# Patient Record
Sex: FEMALE | Race: ASIAN | NOT HISPANIC OR LATINO | URBAN - METROPOLITAN AREA
[De-identification: names, ages, dates, MRNs, and addresses within clinical notes are randomized per-mention and may not be internally consistent; named-entity substitution may affect disease eponyms.]

---

## 2022-12-06 ENCOUNTER — EMERGENCY (EMERGENCY)
Facility: HOSPITAL | Age: 37
LOS: 0 days | Discharge: HOME | End: 2022-12-06
Attending: EMERGENCY MEDICINE | Admitting: EMERGENCY MEDICINE

## 2022-12-06 VITALS
DIASTOLIC BLOOD PRESSURE: 68 MMHG | HEART RATE: 98 BPM | TEMPERATURE: 99 F | SYSTOLIC BLOOD PRESSURE: 119 MMHG | OXYGEN SATURATION: 98 % | RESPIRATION RATE: 20 BRPM

## 2022-12-06 DIAGNOSIS — J02.9 ACUTE PHARYNGITIS, UNSPECIFIED: ICD-10-CM

## 2022-12-06 DIAGNOSIS — R50.9 FEVER, UNSPECIFIED: ICD-10-CM

## 2022-12-06 DIAGNOSIS — Z20.822 CONTACT WITH AND (SUSPECTED) EXPOSURE TO COVID-19: ICD-10-CM

## 2022-12-06 DIAGNOSIS — R05.9 COUGH, UNSPECIFIED: ICD-10-CM

## 2022-12-06 DIAGNOSIS — J10.1 INFLUENZA DUE TO OTHER IDENTIFIED INFLUENZA VIRUS WITH OTHER RESPIRATORY MANIFESTATIONS: ICD-10-CM

## 2022-12-06 LAB
FLUAV AG NPH QL: DETECTED
FLUBV AG NPH QL: SIGNIFICANT CHANGE UP
RSV RNA NPH QL NAA+NON-PROBE: SIGNIFICANT CHANGE UP
SARS-COV-2 RNA SPEC QL NAA+PROBE: SIGNIFICANT CHANGE UP

## 2022-12-06 PROCEDURE — 99284 EMERGENCY DEPT VISIT MOD MDM: CPT

## 2022-12-06 NOTE — ED PROVIDER NOTE - NS ED ROS FT
As follows:   CONST: + fever, chills, bodyaches  EYES: No pain, redness, drainage or visual changes.  ENT: No ear pain or discharge, nasal discharge or congestion. No sore throat  CARD: No chest pain, palpitations  RESP: + cough. No SOB, hemoptysis. No hx of asthma or COPD  GI: No abdominal pain, N/V/D

## 2022-12-06 NOTE — ED PROVIDER NOTE - ATTENDING APP SHARED VISIT CONTRIBUTION OF CARE
37-year-old female no past medical history presents with fever congestion chills cough body aches.  Patient's daughter is flu positive.  Patient is a nurse.  Patient is no other complaints.  No abdominal pain.  Patient is tolerating p.o.  Patient is able to eat and drink without any difficulty.  Patient is awake alert.  Vital signs stable.  Patient was already started on Tamiflu as an outpatient.  Patient with nasal congestion.  No trismus.  Lungs clear.  Patient is nontoxic well-appearing.  Patient with viral syndrome.  Patient discharged with outpatient follow-up.  Will send swab.

## 2022-12-06 NOTE — ED PROVIDER NOTE - NSFOLLOWUPINSTRUCTIONS_ED_ALL_ED_FT
Viral Syndrome    WHAT YOU NEED TO KNOW:    Viral syndrome is a term used for symptoms of an infection caused by a virus. Viruses are spread easily from person to person through the air and on shared items. An illness caused by a virus usually goes away in 10 to 14 days without treatment. Antibiotics are not given for a viral infection.     DISCHARGE INSTRUCTIONS:    Call 911 for the following:     You have a seizure.     You cannot be woken.     You have chest pain or trouble breathing.     Seek care immediately if:     You have a stiff neck, a bad headache, and sensitivity to light.     You feel weak, dizzy, or confused.     You stop urinating or urinate a lot less than normal.     You cough up blood or thick, yellow or green, mucus.     You have severe abdominal pain or your abdomen is larger than usual.     Contact your healthcare provider if:     Your symptoms do not get better with treatment, or get worse, after 3 days.     You have a rash or ear pain.     You have burning when you urinate.     You have questions or concerns about your condition or care.    Medicines: You may need any of the following:     Acetaminophen decreases pain and fever. It is available without a doctor's order. Ask how much medicine to take and how often to take it. Follow directions. Acetaminophen can cause liver damage if not taken correctly.     NSAIDs, such as ibuprofen, help decrease swelling, pain, and fever. NSAIDs can cause stomach bleeding or kidney problems in certain people. If you take blood thinner medicine, always ask your healthcare provider if NSAIDs are safe for you. Always read the medicine label and follow directions.    Cold medicine helps decrease swelling, control a cough, and relieve chest or nasal congestion.     Saline nasal spray helps decrease nasal congestion.     Take your medicine as directed. Contact your healthcare provider if you think your medicine is not helping or if you have side effects. Tell him of her if you are allergic to any medicine. Keep a list of the medicines, vitamins, and herbs you take. Include the amounts, and when and why you take them. Bring the list or the pill bottles to follow-up visits. Carry your medicine list with you in case of an emergency.    Manage your symptoms:     Drink liquids as directed to prevent dehydration. Ask how much liquid to drink each day and which liquids are best for you. Ask if you should drink an oral rehydration solution (ORS). An ORS has the right amounts of water, salts, and sugar you need to replace body fluids. This may help prevent dehydration caused by vomiting or diarrhea. Do not drink liquids with caffeine. Drinks with caffeine can make dehydration worse.     Get plenty of rest to help your body heal. Take naps throughout the day. Ask your healthcare provider when you can return to work and your normal activities.     Use a cool mist humidifier to help you breathe easier if you have nasal or chest congestion. Ask your healthcare provider how to use a cool mist humidifier.     Eat honey or use cough drops to help decrease throat discomfort. Ask your healthcare provider how much honey you should eat each day. Cough drops are available without a doctor's order. Follow directions for taking cough drops.     Do not smoke and stay away from others who smoke. Nicotine and other chemicals in cigarettes and cigars can cause lung damage. Smoking can also delay healing. Ask your healthcare provider for information if you currently smoke and need help to quit. E-cigarettes or smokeless tobacco still contain nicotine. Talk to your healthcare provider before you use these products.     Wash your hands frequently to prevent the spread of germs to others. Use soap and water. Use gel hand  when soap and water are not available. Wash your hands after you use the bathroom, cough, or sneeze. Wash your hands before you prepare or eat food.     Follow up with your healthcare provider as directed: Write down your questions so you remember to ask them during your visits.

## 2022-12-06 NOTE — ED PROVIDER NOTE - CLINICAL SUMMARY MEDICAL DECISION MAKING FREE TEXT BOX
Patient with viral syndrome.  Patient with recent sick contact of somebody with the flu.  Patient is nontoxic well-appearing.  Patient breathing comfortably.  Patient with viral syndrome.  Patient discharged with outpatient follow-up.

## 2022-12-06 NOTE — ED PROVIDER NOTE - PATIENT PORTAL LINK FT
You can access the FollowMyHealth Patient Portal offered by [facility] by registering at the following website: [email or web address]. By joining [employer]s FollowMyHealth portal, you will also be able to view your health information using other applications (apps) compatible with our system.

## 2022-12-06 NOTE — ED PROVIDER NOTE - PHYSICAL EXAMINATION
As Follows:  CONST: Well appearing in NAD  EYES: EOMI, Sclera and conjunctiva clear.  ENT: No nasal discharge. Oropharynx normal appearing, no erythema or exudates. Uvula midline.  CARD: Normal S1 S2; Normal rate and rhythm  RESP: Equal BS B/L, No wheezes, rhonchi or rales. No distress

## 2022-12-06 NOTE — ED PROVIDER NOTE - ADDITIONAL NOTES AND INSTRUCTIONS:
Ms. Horvath was present in the Emergency Department on 12/6/22. She is currently pending her Flu/Covid/RSV swab results.

## 2022-12-06 NOTE — ED PROVIDER NOTE - OBJECTIVE STATEMENT
Pt is a 38 y/o female with no PMHx presents for fever, chills, nonproductive cough, sore throat, and body aches. She states her daughter is sick flu + at home. 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 She denies any shortness of breath, chest pain, N/V/D/C, abdominal pain, or urinary complaints. Pt is a 38 y/o female with no PMHx presents for fever, chills, nonproductive cough, sore throat, and body aches without aggravating or relieving factors. She states her daughter is sick flu + at home. 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 She denies any shortness of breath, chest pain, N/V/D/C, abdominal pain, or urinary complaints.

## 2023-02-12 NOTE — ED ADULT NURSE NOTE - NSIMPLEMENTINTERV_GEN_ALL_ED
92
Implemented All Universal Safety Interventions:  [place] to call system. Call bell, personal items and telephone within reach. Instruct patient to call for assistance. Room bathroom lighting operational. Non-slip footwear when patient is off stretcher. Physically safe environment: no spills, clutter or unnecessary equipment. Stretcher in lowest position, wheels locked, appropriate side rails in place.

## 2023-10-12 NOTE — ED PROVIDER NOTE - WET READ LAUNCH FT
There are no Wet Read(s) to document. Mid-Level Procedure Text (A): After obtaining clear surgical margins the patient was sent to a mid-level provider for surgical repair.  The patient understands they will receive post-surgical care and follow-up from the mid-level provider. DC instructions

## 2023-11-15 ENCOUNTER — EMERGENCY (EMERGENCY)
Facility: HOSPITAL | Age: 38
LOS: 0 days | Discharge: LEFT BEFORE DISCHARGE | End: 2023-11-15
Attending: EMERGENCY MEDICINE
Payer: COMMERCIAL

## 2023-11-15 VITALS
HEART RATE: 97 BPM | SYSTOLIC BLOOD PRESSURE: 127 MMHG | RESPIRATION RATE: 16 BRPM | WEIGHT: 139.99 LBS | DIASTOLIC BLOOD PRESSURE: 77 MMHG | OXYGEN SATURATION: 99 % | TEMPERATURE: 99 F

## 2023-11-15 DIAGNOSIS — U07.1 COVID-19: ICD-10-CM

## 2023-11-15 DIAGNOSIS — Z53.29 PROCEDURE AND TREATMENT NOT CARRIED OUT BECAUSE OF PATIENT'S DECISION FOR OTHER REASONS: ICD-10-CM

## 2023-11-15 DIAGNOSIS — R09.81 NASAL CONGESTION: ICD-10-CM

## 2023-11-15 LAB
SARS-COV-2 RNA SPEC QL NAA+PROBE: DETECTED
SARS-COV-2 RNA SPEC QL NAA+PROBE: DETECTED

## 2023-11-15 PROCEDURE — 99283 EMERGENCY DEPT VISIT LOW MDM: CPT

## 2023-11-15 PROCEDURE — 87635 SARS-COV-2 COVID-19 AMP PRB: CPT

## 2023-11-15 PROCEDURE — 99284 EMERGENCY DEPT VISIT MOD MDM: CPT

## 2023-11-15 RX ORDER — DEXAMETHASONE 0.5 MG/5ML
10 ELIXIR ORAL ONCE
Refills: 0 | Status: COMPLETED | OUTPATIENT
Start: 2023-11-15 | End: 2023-11-15

## 2023-11-15 NOTE — ED ADULT NURSE NOTE - OBJECTIVE STATEMENT
pt has + covid test at home, needs pcr test for work, pt expressed in aggressive way to staff that she needs pcr covid swab and needs to leave because she has small children at home. Pt was reassured that provider would speak to her and treat as necessary, pt stated "Just go and get the doctor".

## 2023-11-15 NOTE — ED PROVIDER NOTE - OBJECTIVE STATEMENT
38-year-old female no apparent past medical history presents for evaluation.  Patient states she has mild nasal congestion sore throat for the past couple days.  Patient took a home COVID test that was positive.  Now requesting COVID PCR for work.  Denies fever, headache, chest pain, shortness of breath, weakness, numbness, dysuria, hematuria, vomiting, diarrhea.

## 2023-11-15 NOTE — ED PROVIDER NOTE - PHYSICAL EXAMINATION
CONST: Well appearing in NAD  EYES: Sclera and conjunctiva clear.   ENT: No nasal discharge. Oropharynx mild erythema. No abscess or swelling. Uvula midline.   NECK: Non-tender, no meningeal signs. normal ROM. supple   CARD: S1 S2; No jvd  RESP: Equal BS B/L, No wheezes, rhonchi or rales. No distress  NEURO: A&Ox3, No focal deficits. Strength 5/5 with no sensory deficits. Steady gait.

## 2023-11-16 PROBLEM — Z78.9 OTHER SPECIFIED HEALTH STATUS: Chronic | Status: ACTIVE | Noted: 2022-12-06

## 2024-08-21 NOTE — ED ADULT NURSE NOTE - NS ED NURSE ELOPE DATE TIME
"  Caller: Nedra Roberts \"Jeniffer\"    Relationship: Self    Best call back number: [phone]     What is the best time to reach you: ANY    Who are you requesting to speak with (clinical staff, provider,  specific staff member):       What was the call regarding: PATIENT IS HAVING PAIN AND SWELLING IN RIGHT FOOT AND WANTED TO KNOW WHAT SHE NEEDED TO DO?    PLEASE CALL      " 15-Nov-2023 20:08